# Patient Record
Sex: MALE | Race: WHITE | NOT HISPANIC OR LATINO | Employment: OTHER | URBAN - NONMETROPOLITAN AREA
[De-identification: names, ages, dates, MRNs, and addresses within clinical notes are randomized per-mention and may not be internally consistent; named-entity substitution may affect disease eponyms.]

---

## 2017-05-18 ENCOUNTER — OFFICE VISIT - GICH (OUTPATIENT)
Dept: FAMILY MEDICINE | Facility: OTHER | Age: 32
End: 2017-05-18

## 2017-05-18 ENCOUNTER — HISTORY (OUTPATIENT)
Dept: FAMILY MEDICINE | Facility: OTHER | Age: 32
End: 2017-05-18

## 2017-05-18 DIAGNOSIS — L98.9 DISORDER OF SKIN OR SUBCUTANEOUS TISSUE: ICD-10-CM

## 2017-05-18 DIAGNOSIS — D22.9 MELANOCYTIC NEVUS: ICD-10-CM

## 2017-06-01 ENCOUNTER — HISTORY (OUTPATIENT)
Dept: FAMILY MEDICINE | Facility: OTHER | Age: 32
End: 2017-06-01

## 2017-06-01 ENCOUNTER — OFFICE VISIT - GICH (OUTPATIENT)
Dept: FAMILY MEDICINE | Facility: OTHER | Age: 32
End: 2017-06-01

## 2017-06-01 DIAGNOSIS — L98.9 DISORDER OF SKIN OR SUBCUTANEOUS TISSUE: ICD-10-CM

## 2017-07-31 ENCOUNTER — HISTORY (OUTPATIENT)
Dept: FAMILY MEDICINE | Facility: OTHER | Age: 32
End: 2017-07-31

## 2017-07-31 ENCOUNTER — OFFICE VISIT - GICH (OUTPATIENT)
Dept: FAMILY MEDICINE | Facility: OTHER | Age: 32
End: 2017-07-31

## 2017-07-31 DIAGNOSIS — S05.02XA INJURY OF CONJUNCTIVA AND CORNEAL ABRASION OF LEFT EYE WITHOUT FOREIGN BODY: ICD-10-CM

## 2017-12-28 NOTE — PROGRESS NOTES
Patient Information     Patient Name MRN Sex Jean Paul Cee [id number] Male 1985      Progress Notes by Iman Stanford MD at 2017  9:45 AM     Author:  Iman Stanford MD Service:  (none) Author Type:  Physician     Filed:  2017  9:44 AM Encounter Date:  2017 Status:  Signed     :  Iman Stanford MD (Physician)            SUBJECTIVE:    Jean Paul Andujar is a 31 y.o. male who presents for skin lesion on right middle finger    HPI Comments: Was seen by [facility], she recommend returning for bx if changed  He reports had a small lump which has resolved, now just scaly  He is less worried about it today  No personal or family history of skin cancer  He works outside, fair skinned      No Known Allergies and   No current outpatient prescriptions on file prior to visit.     No current facility-administered medications on file prior to visit.        REVIEW OF SYSTEMS:  ROS    OBJECTIVE:  /80  Pulse 60  Wt 95.3 kg (210 lb 3.2 oz)  BMI 29.52 kg/m2    EXAM:   Physical Exam   Skin:   Right middle finger, medial side, 0.2cm lightly pigmented macule with hypopigmented center, mild scales    Treated with liquid nitrogen in 3 cycles       ASSESSMENT/PLAN:  Skin lesion, some characteristics of dermatofibroma, but now has scaling, reviewed options  w patient    Plan:  Liquid nitrogen applied in 3 cycles, he will monitor for resolution, if enlarges, darkens or changes, he will return for biopsy    Iman Craven MD  9:43 AM 2017

## 2017-12-28 NOTE — PATIENT INSTRUCTIONS
Patient Information     Patient Name MRN Jean Paul Kirkpatrick [id number] Male 1985      Patient Instructions by Cat Pittman NP at 2017  4:30 PM     Author:  Cat Pittman NP Service:  (none) Author Type:  PHYS- Nurse Practitioner     Filed:  2017  5:36 PM Encounter Date:  2017 Status:  Signed     :  Cat Pittman NP (PHYS- Nurse Practitioner)            Corneal Abrasion   ________________________________________________________________________  KEY POINTS    A corneal abrasion is a scratch on the clear outer layer on the front of your eye.    Treatment may include eye drops or ointment. If you wear contact lenses, your healthcare provider may ask you to wait one week or longer after your cornea has healed before you wear your contact lenses again.  ________________________________________________________________________  What is a corneal abrasion?   A corneal abrasion is a scratch on the surface of the cornea, which is the clear outer layer on the front of your eye. Corneal abrasions are usually very painful.  Most corneal abrasions heal in a day or two. Some abrasions will take longer to heal.   What is the cause?   Corneal abrasions can be caused by:    Getting poked or hit in your eye    Getting scratched in your eye with any object, such as a fingernail, comb, or twig    Getting something in your eye, such as a splinter, dirt, or eye makeup    Chipped or cracked contact lenses, or wearing lenses too long  What are the symptoms?   Symptoms may include:    Redness, pain, and watery eyes    A scratchy feeling or feeling like there is something in your eye    Painful sensitivity to light    Blurry vision    Eyelid spasms  How is it diagnosed?   Your healthcare provider will ask about your symptoms and activities and examine your eye. Using eye drops and a light that makes an abrasion easier to see, your provider will look at your eye. The drops contain a dye that  will make your vision and tears yellow for a few minutes.  How is it treated?   If something is still in your eye, your healthcare provider will remove it.  Your healthcare provider may:    Give you antibiotic drops or ointment to prevent an infection.    Give you eye drops or ointment to help relieve pain.    If you also have eyelid spasms, or severe sensitivity to light, your provider may give you eye drops that dilate your pupil, which relaxes the muscles in your eye and reduces pain.    Place a contact lens bandage over your cornea. The bandage helps to speed up healing and reduces eye pain.  If you wear contact lenses, your healthcare provider may ask you to wait one week or longer after your cornea has healed before you wear your contact lenses again.   How can I take care of myself?   Follow the full course of treatment your healthcare provider prescribes. Ask your healthcare provider:    How long it will take to recover    If there are activities you should avoid and when you can return to your normal activities    How to take care of yourself at home    What symptoms or problems you should watch for and what to do if you have them  Make sure you know when you should come back for a checkup. Keep all appointments for provider visits or tests.  How can I help prevent a corneal abrasion?    Once you ve had a corneal abrasion, you are at risk for a repeat abrasion in the same area. It may help to use artificial tears or eye ointment to lubricate your eyes well after an abrasion has healed.    To help prevent severe eye injuries, wear safety eyewear when you:    Do any work around the house that requires hammering, power tools, chemicals, or splatter of any kind    Play sports such as paintball, racquetball, lacrosse, hockey, and fast-pitch softball    Shoot firearms or use explosives of any kind    Are in a high-risk area such as a construction site or shooting range    Follow your eye care provider's  instructions for wearing and caring for contact lenses. Do not wear them longer than recommended.

## 2017-12-28 NOTE — PROGRESS NOTES
Patient Information     Patient Name MRN Sex Jean Paul Cee [id number] Male 1985      Progress Notes by Cat Pittman NP at 2017  4:30 PM     Author:  Cat Pittman NP Service:  (none) Author Type:  PHYS- Nurse Practitioner     Filed:  2017  9:41 PM Encounter Date:  2017 Status:  Signed     :  Cat Pittman NP (PHYS- Nurse Practitioner)            Nursing Notes:   Celeste Gaston  2017  5:49 PM  Addendum  Patient presents to clinic with sawdust in left eye.  Celeste BRIAN GurmeetjenelleLPN ....................  2017   4:57 PM    Sodium Chloride Solution ordered by Cat Pittman NP.  Medication administered per order in [employer]   Lot # [id number]  Exp.   NDC 5729-4457-34  Patient tolerated well.  Celeste Gaston LPN..................2017  5:45 PM    Tetracaine hydrochloride ophthalmic solution 0.5% ordered by Cat Pittman NP.  Medication administered per order in [employer]   Lot # [id number]  Exp. 2018  NDC 0526-3784-40  Patient tolerated well.  Celeste Gaston LPN..................2017  5:46 PM    Fluorescein Ophthalmic Strip 1mg ordered by Cat Pittman NP.  Medication administered per order in [employer]   Lot # [id number]  Exp.   NDC 87392-345-26  Patient tolerated well.  Celeste Gaston LPN..................2017  5:47 PM        SUBJECTIVE:    Jean Paul Andujar is a 31 y.o. male who presents for eye injury     Eye Injury    The left eye is affected. This is a new problem. The current episode started today. The problem occurs constantly. The problem has been unchanged. The injury mechanism was a foreign body (Sawdust flew into his eye). The pain is moderate. There is no known exposure to pink eye. He does not wear contacts. Associated symptoms include blurred vision, eye redness and a foreign body sensation. Pertinent negatives include no eye discharge, double vision, fever, itching, nausea, photophobia, recent URI or vomiting. He has tried nothing  "for the symptoms. The treatment provided no relief.       No current outpatient prescriptions on file prior to visit.     No current facility-administered medications on file prior to visit.        REVIEW OF SYSTEMS:  Review of Systems   Constitutional: Negative for fever.   Eyes: Positive for blurred vision and redness. Negative for double vision, photophobia and discharge.   Gastrointestinal: Negative for nausea and vomiting.   Skin: Negative for itching.       OBJECTIVE:  /78  Pulse 64  Temp 98.2  F (36.8  C) (Tympanic)  Ht 1.803 m (5' 11\")  Wt 94.7 kg (208 lb 12.8 oz)  BMI 29.12 kg/m2    EXAM:   Physical Exam   Constitutional: He is well-developed, well-nourished, and in no distress.   HENT:   Head: Normocephalic and atraumatic.   Eyes: EOM are normal. Pupils are equal, round, and reactive to light. Right eye exhibits no discharge and no exudate. Foreign body present in the left eye. Right conjunctiva is not injected. Right conjunctiva has no hemorrhage. Left conjunctiva is injected. Left conjunctiva has no hemorrhage.   Slit lamp exam:       The right eye shows no fluorescein uptake.        The left eye shows fluorescein uptake.       Neck: Neck supple.   Cardiovascular: Normal rate.    Pulmonary/Chest: Effort normal. No respiratory distress.   Lymphadenopathy:     He has no cervical adenopathy.   Skin: Skin is warm and dry.   Psychiatric: Affect normal.   Nursing note and vitals reviewed.      ASSESSMENT/PLAN:    ICD-10-CM    1. Cornea abrasion, left, initial encounter S05.02XA gentamicin (GENOPTIC) 0.3 % ophthalmic solution      AMB CONSULT TO OPTOMETRY/OPHTHALMOLOGY        Plan:  Eye drop discussed. FB removed. Referral to eye doctor incase he is not better in 24-36 hours. Eye patch for comfort. F/u if needed with eye doctor. .      LEONARDA REYES NP ....................  8/1/2017   9:41 PM            "

## 2017-12-30 NOTE — NURSING NOTE
Patient Information     Patient Name MRN Jean Paul Kirkpatrick [id number] Male 1985      Nursing Note by Mckenna Feliz at 2017  9:45 AM     Author:  Mckenna Feliz Service:  (none) Author Type:  (none)     Filed:  2017 11:46 AM Encounter Date:  2017 Status:  Signed     :  Mckenna Feliz            Patient presents to clinic for derm issue on right middle finger.  Mckenna Feliz LPN............................2017 9:46 AM

## 2017-12-30 NOTE — NURSING NOTE
Patient Information     Patient Name MRN Jean Paul Kirkpatrick [id number] Male 1985      Nursing Note by Celeste Gaston at 2017  4:30 PM     Author:  Celeste Gaston  Service:  (none) Author Type:  (none)     Filed:  2017  5:49 PM  Encounter Date:  2017 Status:  Addendum     :  Celeste Gaston        Related Notes: Original Note by Celeste Gaston filed at 2017  5:34 PM            Patient presents to clinic with sawdust in left eye.  Celeste ChristiansonN ....................  2017   4:57 PM    Sodium Chloride Solution ordered by Cat Pittman NP.  Medication administered per order in [employer]   Lot # [id number]  Exp.   NDC 8142-7394-76  Patient tolerated well.  Celeste Gaston LPN..................2017  5:45 PM    Tetracaine hydrochloride ophthalmic solution 0.5% ordered by Cat Pittman NP.  Medication administered per order in [employer]   Lot # [id number]  Exp. 2018  NDC 7538-4699-93  Patient tolerated well.  Celeste Gaston LPN..................2017  5:46 PM    Fluorescein Ophthalmic Strip 1mg ordered by Cat Pittman NP.  Medication administered per order in [employer]   Lot # [id number]  Exp.   NDC 64667-513-11  Patient tolerated well.  Celeste Gaston LPN..................2017  5:47 PM

## 2018-01-05 NOTE — PROGRESS NOTES
Patient Information     Patient Name MRN Sex Jean Paul Cee [id number] Male 1985      Progress Notes by Hodan Grimes MD at 2017  2:00 PM     Author:  Hodan Grimes MD Service:  (none) Author Type:  Physician     Filed:  2017  4:54 PM Encounter Date:  2017 Status:  Signed     :  Hodan Grimes MD (Physician)            SUBJECTIVE:    Jean Paul Andujar is a 31 y.o. male who presents for a mole.      HPI  I personally reviewed medications/allergies/history listed below:      No Known Allergies,   Family History       Problem   Relation Age of Onset     Genetic  Other      None noted     ,   No current outpatient prescriptions on file prior to visit.     No current facility-administered medications on file prior to visit.    ,   Past Medical History:     Diagnosis  Date     Dyspepsia      h-pylori positive       Health care maintenance    ,   Patient Active Problem List     Diagnosis  Code     DYSPEPSIA, HX OF Z87.19     SEBACEOUS CYST L72.3    and No past surgical history on file.  Social History     Social History        Marital status:       Spouse name: N/A     Number of children:  N/A     Years of education:  N/A     Occupational History      Not on file.     Social History Main Topics       Smoking status: Never Smoker     Smokeless tobacco: Never Used     Alcohol use No     Drug use: No     Sexual activity: Not on file     Other Topics  Concern     Not on file      Social History Narrative     , no children.  .    Wife - Vashti    Mom - Kaye Andujar    Sister - Ya Martinez      REVIEW OF SYSTEMS:  Review of Systems   All other systems reviewed and are negative.      OBJECTIVE:  /78  Pulse 80  Wt 96.4 kg (212 lb 9.6 oz)  BMI 29.86 kg/m2    EXAM:   Physical Exam   Constitutional: He is well-developed, well-nourished, and in no distress.   Skin:   Right lateral 3rd finger with an approximate 3 mm irregularly bordered  salmon-brown colored raised lesion.  There is a small 1 mm central scab.    In general he is very fair skin with numerous freckles.   Psychiatric: Affect normal.   PHQ Depression Screen     Over the last 2 weeks, how often have you been bothered by any of the following problems?  Patient declined.                      ASSESSMENT/PLAN:    ICD-10-CM    1. Skin lesion L98.9    2. Numerous moles D22.9 AMB CONSULT TO DERMATOLOGY        Plan:    1.  He will return to clinic in the near future to have this lesion removed.  It would be amenable to either punch biopsy or shave biopsy.    2.  He is referred to Dermatology for more extensive check of moles given his fair skin.  Hodan Grimes MD

## 2018-01-05 NOTE — NURSING NOTE
Patient Information     Patient Name MRN Jean Paul Kirkpatrick [id number] Male 1985      Nursing Note by Yanet Blum at 2017  2:00 PM     Author:  Yanet Blum Service:  (none) Author Type:  (none)     Filed:  2017  2:29 PM Encounter Date:  2017 Status:  Signed     :  Yanet Blum            Patient is here today to establish care and also have a mole looked at on his right middle finger. Yanet Blum LPN......................2017 2:17 PM

## 2018-01-26 VITALS
WEIGHT: 212.6 LBS | SYSTOLIC BLOOD PRESSURE: 122 MMHG | DIASTOLIC BLOOD PRESSURE: 78 MMHG | HEART RATE: 80 BPM | HEIGHT: 71 IN | BODY MASS INDEX: 29.86 KG/M2 | DIASTOLIC BLOOD PRESSURE: 78 MMHG | HEART RATE: 60 BPM | WEIGHT: 210.2 LBS | SYSTOLIC BLOOD PRESSURE: 130 MMHG | DIASTOLIC BLOOD PRESSURE: 80 MMHG | WEIGHT: 208.8 LBS | SYSTOLIC BLOOD PRESSURE: 128 MMHG | BODY MASS INDEX: 29.52 KG/M2 | TEMPERATURE: 98.2 F | BODY MASS INDEX: 29.23 KG/M2 | HEART RATE: 64 BPM

## 2018-02-06 ENCOUNTER — DOCUMENTATION ONLY (OUTPATIENT)
Dept: FAMILY MEDICINE | Facility: OTHER | Age: 33
End: 2018-02-06

## 2018-02-06 PROBLEM — Z87.19 PERSONAL HISTORY OF OTHER DISEASES OF DIGESTIVE SYSTEM: Status: ACTIVE | Noted: 2018-02-06

## 2018-03-20 ENCOUNTER — OFFICE VISIT (OUTPATIENT)
Dept: FAMILY MEDICINE | Facility: OTHER | Age: 33
End: 2018-03-20
Attending: FAMILY MEDICINE
Payer: COMMERCIAL

## 2018-03-20 VITALS
TEMPERATURE: 97.6 F | WEIGHT: 215 LBS | HEIGHT: 71 IN | SYSTOLIC BLOOD PRESSURE: 136 MMHG | BODY MASS INDEX: 30.1 KG/M2 | HEART RATE: 72 BPM | DIASTOLIC BLOOD PRESSURE: 80 MMHG

## 2018-03-20 DIAGNOSIS — D22.9 ATYPICAL MOLE: ICD-10-CM

## 2018-03-20 DIAGNOSIS — S61.219A CUT OF FINGER: Primary | ICD-10-CM

## 2018-03-20 PROCEDURE — 99213 OFFICE O/P EST LOW 20 MIN: CPT | Performed by: FAMILY MEDICINE

## 2018-03-20 ASSESSMENT — PAIN SCALES - GENERAL: PAINLEVEL: NO PAIN (0)

## 2018-03-20 NOTE — NURSING NOTE
Patient is here for concerns of cut on right pointer finger. Happened last night while washing dishes, a glass broke.   Bisi Lujan LPN .............3/20/27726:06 AM

## 2018-03-20 NOTE — MR AVS SNAPSHOT
"              After Visit Summary   3/20/2018    Jean Paul Andujar    MRN: [id number]           Patient Information     Date Of Birth          1985        Visit Information        Provider Department      3/20/2018 9:00 AM Iman Infante MD [facility]        Today's Diagnoses     Cut of finger    -  1    Atypical mole           Follow-ups after your visit        Additional Services     GENERAL SURG ADULT REFERRAL       mole excision                  Who to contact     If you have questions or need follow up information about today's clinic visit or your schedule please contact [facility] directly at [phone].  Normal or non-critical lab and imaging results will be communicated to you by MyChart, letter or phone within 4 business days after the clinic has received the results. If you do not hear from us within 7 days, please contact the clinic through MyChart or phone. If you have a critical or abnormal lab result, we will notify you by phone as soon as possible.  Submit refill requests through [employer] or call your pharmacy and they will forward the refill request to us. Please allow 3 business days for your refill to be completed.          Additional Information About Your Visit        Care EveryWhere ID     This is your Care EveryWhere ID. This could be used by other organizations to access your [place] medical records  [id number]        Your Vitals Were     Pulse Temperature Height BMI (Body Mass Index)          72 97.6  F (36.4  C) 5' 11\" (1.803 m) 29.99 kg/m2         Blood Pressure from Last 3 Encounters:   03/20/18 136/80   07/31/17 128/78   06/01/17 122/80    Weight from Last 3 Encounters:   03/20/18 215 lb (97.5 kg)   07/31/17 208 lb 12.8 oz (94.7 kg)   06/01/17 210 lb 3.2 oz (95.3 kg)              We Performed the Following     GENERAL SURG ADULT REFERRAL        Primary Care Provider Fax #    Physician No Ref-Primary [phone]       " No address on file        Equal Access to Services     NITHIN RIVERA : Hadii aad ku hadtom Gregoryali, wacheriseda juanischristopherha, qawarren karaimarc sanchez, anam rice. So [facility] [phone].    ATENCIÓN: Si habla español, tiene a sadler disposición servicios gratuitos de asistencia lingüística. Llame al [phone].    We comply with applicable federal civil rights laws and [place] laws. We do not discriminate on the basis of race, color, national origin, age, disability, sex, sexual orientation, or gender identity.            Thank you!     Thank you for choosing [facility] AND [facility]  for your care. Our goal is always to provide you with excellent care. Hearing back from our patients is one way we can continue to improve our services. Please take a few minutes to complete the written survey that you may receive in the mail after your visit with us. Thank you!             Your Updated Medication List - Protect others around you: Learn how to safely use, store and throw away your medicines at www.disposemymeds.org.          This list is accurate as of 3/20/18  9:44 AM.  Always use your most recent med list.                   Brand Name Dispense Instructions for use Diagnosis    RANITIDINE HCL PO      Take by mouth daily

## 2018-03-20 NOTE — PROGRESS NOTES
"  SUBJECTIVE:   Jean Paul Andujar is a 32 year old male who presents to clinic today for the following health issues:  Nursing Notes:   Bisi Lujan LPN  3/20/2018  9:36 AM  Signed  Patient is here for concerns of cut on right pointer finger. Happened last night while washing dishes, a glass broke.   Bisi Lujan LPN .............3/20/85073:06 AM      HPI  32-year-old male presents after cutting his finger last evening while doing the dishes.  Medial side of the right pointer finger.  It oozed throughout the night but seems of slowing down this morning.  Sensation and strength are intact.  Tdap is current.    Patient Active Problem List    Diagnosis Date Noted     Personal history of other diseases of digestive system 02/06/2018     Priority: Medium     Overview:   History of dyspepsia, h-pylori positive       Sebaceous cyst 06/07/2010     Priority: Medium     Past Medical History:   Diagnosis Date     Encounter for general adult medical examination without abnormal findings     No Comments Provided     Epigastric pain     h-pylori positive        Review of Systems     OBJECTIVE:     /80  Pulse 72  Temp 97.6  F (36.4  C)  Ht 5' 11\" (1.803 m)  Wt 215 lb (97.5 kg)  BMI 29.99 kg/m2  Body mass index is 29.99 kg/(m^2).  Physical Exam    Superficial 2 cm laceration medial aspect of the right pointer finger.  No gaping.  Wound is clean sensation and strength are normal and theed and Steri-Strips are applied to reapproximate wound edges.   pointer finger.     ASSESSMENT/PLAN:     1. Cut of finger    2. Atypical mole            Discussed wound care.  Tetanus is current.  Given the location of mole have referred to general surgery for excision.    Iman Stanford MD  [facility] [place]  "

## 2018-04-19 ENCOUNTER — OFFICE VISIT (OUTPATIENT)
Dept: SURGERY | Facility: OTHER | Age: 33
End: 2018-04-19
Attending: SURGERY
Payer: COMMERCIAL

## 2018-04-19 VITALS — BODY MASS INDEX: 30.46 KG/M2 | DIASTOLIC BLOOD PRESSURE: 92 MMHG | SYSTOLIC BLOOD PRESSURE: 136 MMHG | WEIGHT: 218.4 LBS

## 2018-04-19 DIAGNOSIS — Z53.9 DIAGNOSIS NOT YET DEFINED: Primary | ICD-10-CM

## 2018-04-19 PROCEDURE — 11602 EXC TR-EXT MAL+MARG 1.1-2 CM: CPT | Performed by: SURGERY

## 2018-04-19 PROCEDURE — 88305 TISSUE EXAM BY PATHOLOGIST: CPT | Performed by: SURGERY

## 2018-04-19 NOTE — PROGRESS NOTES
Procedure Note      Pre/Post Operative Diagnosis:   Right second finger skin lesion     Procedure:   Removal of right second finger skin lesion     Surgeon: Joaquin Can MD    Local Anesthesia: 1% lidocaine with 0.25% Marcaine     Indication for the procedure:  This is a 32 year old male  patient referred by self with a right second finger skin lesion      After explaining the risks to include bleeding, infection, recurrence or need for reexcision, and scarring the patientwished to proceed.    Procedure:   The area was prepped and draped in usual sterile fashion with ChloraPrep.   After, adequate localanesthesia, an 0.8 cm X 1.1 cm elliptical skin incision was made to encompass the 0.6 cm lesion margins.  The skin was closed with 4-0 Nylon     Plan:  The patient will be called to review the pathology. Suture removal in 10-14 days.  Patient will follow up if there any problems with the wound including redness or drainage.      Joaquin Can....................  4/19/2018   3:57 PM

## 2018-04-19 NOTE — MR AVS SNAPSHOT
After Visit Summary   4/19/2018    Jean Paul Andujar    MRN: [id number]           Patient Information     Date Of Birth          1985        Visit Information        Provider Department      4/19/2018 3:20 PM Joaquin Can MD; [facility] [address] SURGERY [facility]        Today's Diagnoses     DIAGNOSIS NOT YET DEFINED    -  1       Follow-ups after your visit        Who to contact     If you have questions or need follow up information about today's clinic visit or your schedule please contact [facility] directly at [phone].  Normal or non-critical lab and imaging results will be communicated to you by MyChart, letter or phone within 4 business days after the clinic has received the results. If you do not hear from us within 7 days, please contact the clinic through MyChart or phone. If you have a critical or abnormal lab result, we will notify you by phone as soon as possible.  Submit refill requests through [employer] or call your pharmacy and they will forward the refill request to us. Please allow 3 business days for your refill to be completed.          Additional Information About Your Visit        Care EveryWhere ID     This is your Care EveryWhere ID. This could be used by other organizations to access your [place] medical records  [id number]        Your Vitals Were     BMI (Body Mass Index)                   30.46 kg/m2            Blood Pressure from Last 3 Encounters:   04/19/18 (!) 136/92   03/20/18 136/80   07/31/17 128/78    Weight from Last 3 Encounters:   04/19/18 99.1 kg (218 lb 6.4 oz)   03/20/18 97.5 kg (215 lb)   07/31/17 94.7 kg (208 lb 12.8 oz)              We Performed the Following     0.6-1CM TRUNK,ARM,LEG     Surgical pathology exam        Primary Care Provider Fax #    Physician No Ref-Primary [phone]       No address on file        Equal Access to Services     BEATA STAFFORD : ken Bang,  anam gunn talauryn negronnahomi ruth ah. So [facility] [phone].    ATENCIÓN: Si habla xavi, tiene a sadler disposición servicios gratuitos de asistencia lingüística. Llame al [phone].    We comply with applicable federal civil rights laws and [place] laws. We do not discriminate on the basis of race, color, national origin, age, disability, sex, sexual orientation, or gender identity.            Thank you!     Thank you for choosing [facility] AND [facility]  for your care. Our goal is always to provide you with excellent care. Hearing back from our patients is one way we can continue to improve our services. Please take a few minutes to complete the written survey that you may receive in the mail after your visit with us. Thank you!             Your Updated Medication List - Protect others around you: Learn how to safely use, store and throw away your medicines at www.disposemymeds.org.          This list is accurate as of 4/19/18  3:59 PM.  Always use your most recent med list.                   Brand Name Dispense Instructions for use Diagnosis    RANITIDINE HCL PO      Take by mouth daily

## 2018-04-19 NOTE — NURSING NOTE
Patient presents to clinic with a mole on his finger he would like removed.  Meghana Nicholas LPN  4/19/2018  3:19 PM

## 2018-04-19 NOTE — NURSING NOTE
Consent form signed.    TIMEOUT  Universal Protocol    A. Pre-procedure verification complete yes  1-relevant information / documentation available, reviewed and properly matched to the patient; 2-consent accurate and complete, 3-equipment and supplies available    B. Site marking complete na  Site marked if not in continuous attendance with patient    C. TIME OUT completed yes  Time Out was conducted just prior to starting procedure to verify the eight required elements: 1-patient identity, 2-consent accurate and complete, 3-position, 4-correct side/site marked (if applicable), 5-procedure, 6-relevant images / results properly labeled and displayed (if applicable), 7-antibiotics / irrigation fluids (if applicable), 8-safety precautions.

## 2018-04-30 ENCOUNTER — TELEPHONE (OUTPATIENT)
Dept: SURGERY | Facility: OTHER | Age: 33
End: 2018-04-30

## 2018-05-02 NOTE — TELEPHONE ENCOUNTER
Patient has a couple of questions regarding the letter he was sent.  Wondering if he should come back if he see's any more abnormal lesions.  He was instructed to come back if he has any concerns.  Meghana Nicholas LPN  5/2/2018  9:09 AM

## 2018-05-07 ENCOUNTER — OFFICE VISIT (OUTPATIENT)
Dept: FAMILY MEDICINE | Facility: OTHER | Age: 33
End: 2018-05-07
Attending: PHYSICIAN ASSISTANT
Payer: COMMERCIAL

## 2018-05-07 VITALS
SYSTOLIC BLOOD PRESSURE: 120 MMHG | WEIGHT: 216 LBS | TEMPERATURE: 98.5 F | DIASTOLIC BLOOD PRESSURE: 88 MMHG | HEART RATE: 68 BPM | BODY MASS INDEX: 30.13 KG/M2

## 2018-05-07 DIAGNOSIS — S05.02XA ABRASION OF LEFT CORNEA, INITIAL ENCOUNTER: Primary | ICD-10-CM

## 2018-05-07 PROCEDURE — 99213 OFFICE O/P EST LOW 20 MIN: CPT | Performed by: PHYSICIAN ASSISTANT

## 2018-05-07 RX ORDER — POLYMYXIN B SULFATE AND TRIMETHOPRIM 1; 10000 MG/ML; [USP'U]/ML
1 SOLUTION OPHTHALMIC 4 TIMES DAILY
Qty: 1 ML | Refills: 0 | Status: SHIPPED | OUTPATIENT
Start: 2018-05-07 | End: 2018-05-12

## 2018-05-07 NOTE — MR AVS SNAPSHOT
After Visit Summary   5/7/2018    Jean Paul Andujar    MRN: [id number]           Patient Information     Date Of Birth          1985        Visit Information        Provider Department      5/7/2018 7:00 PM Carla Cevallos PA [facility]        Today's Diagnoses     Abrasion of left cornea, initial encounter    -  1      Care Instructions    Corneal abrasion left eye  Start polytrim 1-2 drops to affected eye 4 times daily for 3-5 days  Follow up with opthalmology for eye pain or worsening of symptoms, or no improvement after 24-48 hours.   Seek immediate care     Increasing eye pain or pain that does not improve after 24 hours    Discharge from the eye    Increasing redness of the eye or swelling of the eyelids    Worsening vision    Symptoms get worse after the abrasion has healed    Corneal Abrasion    You have received a scratch or scrape (abrasion) to your cornea. The cornea is the clear part in the front of the eye. This sensitive area is very painful when injured. You may make tears frequently, and your vision may be blurry until the injury heals. You may be sensitive to light.  This part of the body heals quickly. You can expect the pain to go away within 24 to 48 hours. If the abrasion is large or deep, your doctor may apply an eye patch, although this is not always done. An antibiotic ointment or eye drops may also be used to prevent infection.  Numbing drops may be used to relieve the pain temporarily so that your eyes can be examined. But these drops can t be prescribed for home use because that would prevent healing and lead to more serious problems. Also, if you can t feel your eye, there is a chance of accidentally injuring it further without knowing it.  Home care    A cold pack may be applied over the eye (or eye patch) for 20 minutes at a time, to reduce pain. To make a cold pack, put ice cubes in a plastic bag that seals at the top. Wrap the bag in a clean,  thin towel or cloth.    You may use acetaminophen or ibuprofen to control pain, unless another pain medicine was prescribed. Note: If you have chronic liver or kidney disease or have ever had a stomach ulcer or GI bleeding, talk with your doctor before using these medicines.    Rest your eyes and don t read until symptoms are gone.    If you use contact lenses, don t wear them until all symptoms are gone.    If your vision is affected by the corneal abrasion or if an eye patch was applied, don t drive a motor vehicle or operate machinery until all symptoms are gone. You may have trouble judging distances using only one eye.    If your eyes are sensitive to light, try wearing sunglasses, or stay indoors until symptoms go away.  Follow-up care  Follow up with your healthcare provider, or as advised.    If no patch was put on your eye and the pain continues for more than 48 hours, you should have another exam. Contact your healthcare provider to arrange this.    If your eye was patched and you were asked to remove the patch yourself, see your healthcare provider. Contact your healthcare provider if you still have pain after the patch is removed.    If you were given a return appointment for patch removal and re-examination, be sure to keep the appointment. Leaving the patch in place longer than advised could be harmful.  When to seek medical advice  Call your healthcare provider right away if any of these occur.    Increasing eye pain or pain that does not improve after 24 hours    Discharge from the eye    Increasing redness of the eye or swelling of the eyelids    Worsening vision    Symptoms get worse after the abrasion has healed  Date Last Reviewed: 7/1/2017 2000-2017 [employer]. [address] [place] [address], [place] [address]. All rights reserved. This information is not intended as a substitute for professional medical care. Always follow your healthcare professional's instructions.                 Follow-ups after your visit        Follow-up notes from your care team     Return if symptoms worsen or fail to improve.      Who to contact     If you have questions or need follow up information about today's clinic visit or your schedule please contact [facility] directly at [phone].  Normal or non-critical lab and imaging results will be communicated to you by MyChart, letter or phone within 4 business days after the clinic has received the results. If you do not hear from us within 7 days, please contact the clinic through MyChart or phone. If you have a critical or abnormal lab result, we will notify you by phone as soon as possible.  Submit refill requests through [employer] or call your pharmacy and they will forward the refill request to us. Please allow 3 business days for your refill to be completed.          Additional Information About Your Visit        Care EveryWhere ID     This is your Care EveryWhere ID. This could be used by other organizations to access your [place] medical records  [id number]        Your Vitals Were     Pulse Temperature BMI (Body Mass Index)             68 98.5  F (36.9  C) (Tympanic) 30.13 kg/m2          Blood Pressure from Last 3 Encounters:   05/07/18 120/88   04/19/18 (!) 136/92   03/20/18 136/80    Weight from Last 3 Encounters:   05/07/18 216 lb (98 kg)   04/19/18 218 lb 6.4 oz (99.1 kg)   03/20/18 215 lb (97.5 kg)              Today, you had the following     No orders found for display         Today's Medication Changes          These changes are accurate as of 5/7/18  8:31 PM.  If you have any questions, ask your nurse or doctor.               Start taking these medicines.        Dose/Directions    trimethoprim-polymyxin b ophthalmic solution   Commonly known as:  POLYTRIM   Used for:  Abrasion of left cornea, initial encounter   Started by:  Carla Cevallos PA        Dose:  1 drop   Place 1 drop Into the left eye 4 times daily for 5 days    Quantity:  1 mL   Refills:  0            Where to get your medicines      These medications were sent to [id number] - [place], [place] - [address]  [address], [facility] [address]     Phone:  [phone]     trimethoprim-polymyxin b ophthalmic solution                Primary Care Provider Fax #    Physician No Ref-Primary [phone]       No address on file        Equal Access to Services     BEATA STAFFORD : Hadii aad ku hadasho Soomaali, waaxda luqadaha, qaybta kaalmada adeegyada, waxay idiin haysydneen clarice deltacaty ruth . So [facility] [phone].    ATENCIÓN: Si habla español, tiene a sadler disposición servicios gratuitos de asistencia lingüística. Llame al [phone].    We comply with applicable federal civil rights laws and [place] laws. We do not discriminate on the basis of race, color, national origin, age, disability, sex, sexual orientation, or gender identity.            Thank you!     Thank you for choosing [facility]  for your care. Our goal is always to provide you with excellent care. Hearing back from our patients is one way we can continue to improve our services. Please take a few minutes to complete the written survey that you may receive in the mail after your visit with us. Thank you!             Your Updated Medication List - Protect others around you: Learn how to safely use, store and throw away your medicines at www.disposemymeds.org.          This list is accurate as of 5/7/18  8:31 PM.  Always use your most recent med list.                   Brand Name Dispense Instructions for use Diagnosis    RANITIDINE HCL PO      Take by mouth daily        trimethoprim-polymyxin b ophthalmic solution    POLYTRIM    1 mL    Place 1 drop Into the left eye 4 times daily for 5 days    Abrasion of left cornea, initial encounter

## 2018-05-08 NOTE — PATIENT INSTRUCTIONS
Corneal abrasion left eye  Start polytrim 1-2 drops to affected eye 4 times daily for 3-5 days  Follow up with opthalmology for eye pain or worsening of symptoms, or no improvement after 24-48 hours.   Seek immediate care     Increasing eye pain or pain that does not improve after 24 hours    Discharge from the eye    Increasing redness of the eye or swelling of the eyelids    Worsening vision    Symptoms get worse after the abrasion has healed    Corneal Abrasion    You have received a scratch or scrape (abrasion) to your cornea. The cornea is the clear part in the front of the eye. This sensitive area is very painful when injured. You may make tears frequently, and your vision may be blurry until the injury heals. You may be sensitive to light.  This part of the body heals quickly. You can expect the pain to go away within 24 to 48 hours. If the abrasion is large or deep, your doctor may apply an eye patch, although this is not always done. An antibiotic ointment or eye drops may also be used to prevent infection.  Numbing drops may be used to relieve the pain temporarily so that your eyes can be examined. But these drops can t be prescribed for home use because that would prevent healing and lead to more serious problems. Also, if you can t feel your eye, there is a chance of accidentally injuring it further without knowing it.  Home care    A cold pack may be applied over the eye (or eye patch) for 20 minutes at a time, to reduce pain. To make a cold pack, put ice cubes in a plastic bag that seals at the top. Wrap the bag in a clean, thin towel or cloth.    You may use acetaminophen or ibuprofen to control pain, unless another pain medicine was prescribed. Note: If you have chronic liver or kidney disease or have ever had a stomach ulcer or GI bleeding, talk with your doctor before using these medicines.    Rest your eyes and don t read until symptoms are gone.    If you use contact lenses, don t wear them until  all symptoms are gone.    If your vision is affected by the corneal abrasion or if an eye patch was applied, don t drive a motor vehicle or operate machinery until all symptoms are gone. You may have trouble judging distances using only one eye.    If your eyes are sensitive to light, try wearing sunglasses, or stay indoors until symptoms go away.  Follow-up care  Follow up with your healthcare provider, or as advised.    If no patch was put on your eye and the pain continues for more than 48 hours, you should have another exam. Contact your healthcare provider to arrange this.    If your eye was patched and you were asked to remove the patch yourself, see your healthcare provider. Contact your healthcare provider if you still have pain after the patch is removed.    If you were given a return appointment for patch removal and re-examination, be sure to keep the appointment. Leaving the patch in place longer than advised could be harmful.  When to seek medical advice  Call your healthcare provider right away if any of these occur.    Increasing eye pain or pain that does not improve after 24 hours    Discharge from the eye    Increasing redness of the eye or swelling of the eyelids    Worsening vision    Symptoms get worse after the abrasion has healed  Date Last Reviewed: 7/1/2017 2000-2017 [employer]. [address] [place] [address], [place] [address]. All rights reserved. This information is not intended as a substitute for professional medical care. Always follow your healthcare professional's instructions.

## 2018-05-08 NOTE — NURSING NOTE
Patient presents to clinic for complaint of left eye irritation. Patient thinks he got a piece of sawdust in the eye. He tried washing it in the shower to no effect.  Todd Epperson LPN...... 7:59 PM 5/7/2018

## 2018-05-08 NOTE — PROGRESS NOTES
SUBJECTIVE: Jean Paul Andujar 32 year old male who complains of left eye pain and possible sawdust in eye after doing some wood working in his home earlier today. He did irrigate with water. Symptoms: pain, tearing. Course is unchanged. Moderated discomfort.     He does wear glasses for distance. He does not have then with him in [place]. No contact lens.     He denies other  significant symptoms on ROS of the cardiovascular and Respiratory systems. Current medications reviewed and the patient denies any problems with compliance or side effects. Medications reviewed.    Past Medical History:   Diagnosis Date     Encounter for general adult medical examination without abnormal findings     No Comments Provided     Epigastric pain     h-pylori positive     Current Outpatient Prescriptions   Medication     trimethoprim-polymyxin b (POLYTRIM) ophthalmic solution     RANITIDINE HCL PO     No current facility-administered medications for this visit.       No Known Allergies      Objective  Vitals:    05/07/18 1959   BP: 120/88   BP Location: Left arm   Patient Position: Sitting   Cuff Size: Adult Large   Pulse: 68   Temp: 98.5  F (36.9  C)   TempSrc: Tympanic   Weight: 216 lb (98 kg)       General: appears well, alert, moderated distress  Eye: left eye is tearing, mild injection, no drainage. Procedure for foreign body removal - see separate note.   Unable to obtain visual acuity as patient does not have corrective glasses with    Foreign body removal  Date/Time: 5/7/2018 5:30 PM  Performed by: RONNIE NICOLAS  Authorized by: RONNIE NICOLAS   Body area: eye  Anesthesia: local infiltration    Anesthesia:  Local Anesthetic: tetracaine drops    Sedation:  Patient sedated: no  Patient restrained: no  Patient cooperative: yes  Localization method: eyelid eversion and magnification  Removal mechanism: moist cotton swab  Eye examined with fluorescein.  Corneal abrasion size: medium  Corneal abrasion location: medial  No residual rust ring  present.  Complexity: simple  Objects recovered: 0  Post-procedure assessment: no foreign body found.  Patient tolerance: Patient tolerated the procedure well with no immediate complications  Comments: Eye was irrigated with sterile saline.  Uptake of fluorescein noted on cornea at 11-12. No foreign body.          ASSESSMENT:      ICD-10-CM    1. Abrasion of left cornea, initial encounter S05.02XA trimethoprim-polymyxin b (POLYTRIM) ophthalmic solution     Foreign body removal       RX per EPIC   Discussed symptomatic treatments per AVS  Return to clinic or see ophthalmologist if symptoms persist or worsen  Patient received verbal and written instruction including review of warning signs    COLBY Wallace on 5/10/2018 at 3:37 PM

## 2018-12-12 ENCOUNTER — ALLIED HEALTH/NURSE VISIT (OUTPATIENT)
Dept: FAMILY MEDICINE | Facility: OTHER | Age: 33
End: 2018-12-12
Attending: FAMILY MEDICINE
Payer: COMMERCIAL

## 2018-12-12 DIAGNOSIS — Z23 NEED FOR PROPHYLACTIC VACCINATION AND INOCULATION AGAINST INFLUENZA: Primary | ICD-10-CM

## 2018-12-12 PROCEDURE — 90686 IIV4 VACC NO PRSV 0.5 ML IM: CPT

## 2018-12-12 PROCEDURE — 90471 IMMUNIZATION ADMIN: CPT

## 2018-12-12 NOTE — PROGRESS NOTES
Injectable Influenza Immunization Documentation    1.  Is the person to be vaccinated sick today?   No    2. Does the person to be vaccinated have an allergy to a component   of the vaccine?   No  Egg Allergy Algorithm Link    3. Has the person to be vaccinated ever had a serious reaction   to influenza vaccine in the past?   No    4. Has the person to be vaccinated ever had Guillain-Barré syndrome?   No    Form completed by Angelica Omer............................... 12/12/2018 1:59 PM  Prior to injection, verified patient identity using patient's name and date of birth.  Due to injection administration, patient instructed to remain in clinic for 15 minutes  afterwards, and to report any adverse reaction to me immediately.    vaccine     Drug Amount Wasted:  None.  Vial/Syringe: Syringe

## 2019-10-18 ENCOUNTER — ALLIED HEALTH/NURSE VISIT (OUTPATIENT)
Dept: FAMILY MEDICINE | Facility: OTHER | Age: 34
End: 2019-10-18
Payer: COMMERCIAL

## 2019-10-18 DIAGNOSIS — Z23 NEED FOR IMMUNIZATION AGAINST INFLUENZA: Primary | ICD-10-CM

## 2019-10-18 PROCEDURE — 90471 IMMUNIZATION ADMIN: CPT

## 2019-10-18 PROCEDURE — 90686 IIV4 VACC NO PRSV 0.5 ML IM: CPT

## 2019-10-18 NOTE — NURSING NOTE
Immunization Documentation    Prior to Immunization administration, verified patients identity using patient's name and date of birth. Please see IMMUNIZATIONS  and order for additional information.  Patient / Parent instructed to remain in clinic for 15 minutes and report any adverse reaction to staff immediately.    Was entire vial of medication used? Yes  Vial/Syringe: Sparkle Harris LPN  10/18/2019   3:02 PM

## 2020-10-27 ENCOUNTER — ALLIED HEALTH/NURSE VISIT (OUTPATIENT)
Dept: FAMILY MEDICINE | Facility: OTHER | Age: 35
End: 2020-10-27
Payer: COMMERCIAL

## 2020-10-27 DIAGNOSIS — Z23 NEED FOR IMMUNIZATION AGAINST INFLUENZA: Primary | ICD-10-CM

## 2020-10-27 PROCEDURE — 90686 IIV4 VACC NO PRSV 0.5 ML IM: CPT

## 2020-10-27 PROCEDURE — 90471 IMMUNIZATION ADMIN: CPT

## 2021-11-09 ENCOUNTER — HOSPITAL ENCOUNTER (EMERGENCY)
Facility: OTHER | Age: 36
Discharge: HOME | End: 2021-11-10
Attending: EMERGENCY MEDICINE
Payer: COMMERCIAL

## 2021-11-09 ENCOUNTER — APPOINTMENT (OUTPATIENT)
Dept: CT IMAGING | Facility: OTHER | Age: 36
End: 2021-11-09
Attending: EMERGENCY MEDICINE
Payer: COMMERCIAL

## 2021-11-09 DIAGNOSIS — R55 SYNCOPE, UNSPECIFIED SYNCOPE TYPE: ICD-10-CM

## 2021-11-09 LAB
ALBUMIN SERPL-MCNC: 4.5 G/DL (ref 3.5–5.7)
ALBUMIN UR-MCNC: NEGATIVE MG/DL
ALP SERPL-CCNC: 50 U/L (ref 34–104)
ALT SERPL W P-5'-P-CCNC: 23 U/L (ref 7–52)
ANION GAP SERPL CALCULATED.3IONS-SCNC: 8 MMOL/L (ref 3–14)
APPEARANCE UR: CLEAR
AST SERPL W P-5'-P-CCNC: 26 U/L (ref 13–39)
BASOPHILS # BLD AUTO: 0.1 10E3/UL (ref 0–0.2)
BASOPHILS NFR BLD AUTO: 1 %
BILIRUB SERPL-MCNC: 0.7 MG/DL (ref 0.3–1)
BILIRUB UR QL STRIP: NEGATIVE
BUN SERPL-MCNC: 16 MG/DL (ref 7–25)
CALCIUM SERPL-MCNC: 9.6 MG/DL (ref 8.6–10.3)
CHLORIDE BLD-SCNC: 100 MMOL/L (ref 98–107)
CO2 SERPL-SCNC: 26 MMOL/L (ref 21–31)
COLOR UR AUTO: YELLOW
CREAT SERPL-MCNC: 0.97 MG/DL (ref 0.7–1.3)
EOSINOPHIL # BLD AUTO: 0.1 10E3/UL (ref 0–0.7)
EOSINOPHIL NFR BLD AUTO: 1 %
ERYTHROCYTE [DISTWIDTH] IN BLOOD BY AUTOMATED COUNT: 12.8 % (ref 10–15)
GFR SERPL CREATININE-BSD FRML MDRD: >90 ML/MIN/1.73M2
GLUCOSE BLD-MCNC: 119 MG/DL (ref 70–105)
GLUCOSE UR STRIP-MCNC: NEGATIVE MG/DL
HCT VFR BLD AUTO: 40.7 % (ref 40–53)
HGB BLD-MCNC: 14.2 G/DL (ref 13.3–17.7)
HGB UR QL STRIP: NEGATIVE
HYALINE CASTS: 1 /LPF
IMM GRANULOCYTES # BLD: 0.1 10E3/UL
IMM GRANULOCYTES NFR BLD: 1 %
KETONES UR STRIP-MCNC: NEGATIVE MG/DL
LEUKOCYTE ESTERASE UR QL STRIP: NEGATIVE
LYMPHOCYTES # BLD AUTO: 1.4 10E3/UL (ref 0.8–5.3)
LYMPHOCYTES NFR BLD AUTO: 10 %
MCH RBC QN AUTO: 29.6 PG (ref 26.5–33)
MCHC RBC AUTO-ENTMCNC: 34.9 G/DL (ref 31.5–36.5)
MCV RBC AUTO: 85 FL (ref 78–100)
MONOCYTES # BLD AUTO: 1 10E3/UL (ref 0–1.3)
MONOCYTES NFR BLD AUTO: 8 %
MUCOUS THREADS #/AREA URNS LPF: PRESENT /LPF
NEUTROPHILS # BLD AUTO: 10.7 10E3/UL (ref 1.6–8.3)
NEUTROPHILS NFR BLD AUTO: 79 %
NITRATE UR QL: NEGATIVE
NRBC # BLD AUTO: 0 10E3/UL
NRBC BLD AUTO-RTO: 0 /100
PH UR STRIP: 5.5 [PH] (ref 5–9)
PLATELET # BLD AUTO: 268 10E3/UL (ref 150–450)
POTASSIUM BLD-SCNC: 3.5 MMOL/L (ref 3.5–5.1)
PROT SERPL-MCNC: 6.9 G/DL (ref 6.4–8.9)
RBC # BLD AUTO: 4.8 10E6/UL (ref 4.4–5.9)
RBC URINE: 1 /HPF
SODIUM SERPL-SCNC: 134 MMOL/L (ref 134–144)
SP GR UR STRIP: 1.01 (ref 1–1.03)
UROBILINOGEN UR STRIP-MCNC: NORMAL MG/DL
WBC # BLD AUTO: 13.3 10E3/UL (ref 4–11)
WBC URINE: 1 /HPF

## 2021-11-09 PROCEDURE — 93005 ELECTROCARDIOGRAM TRACING: CPT | Mod: XU | Performed by: EMERGENCY MEDICINE

## 2021-11-09 PROCEDURE — 85025 COMPLETE CBC W/AUTO DIFF WBC: CPT | Performed by: EMERGENCY MEDICINE

## 2021-11-09 PROCEDURE — 99284 EMERGENCY DEPT VISIT MOD MDM: CPT | Performed by: EMERGENCY MEDICINE

## 2021-11-09 PROCEDURE — 81001 URINALYSIS AUTO W/SCOPE: CPT | Performed by: EMERGENCY MEDICINE

## 2021-11-09 PROCEDURE — 70450 CT HEAD/BRAIN W/O DYE: CPT | Mod: TC

## 2021-11-09 PROCEDURE — 99285 EMERGENCY DEPT VISIT HI MDM: CPT | Mod: 25 | Performed by: EMERGENCY MEDICINE

## 2021-11-09 PROCEDURE — 80053 COMPREHEN METABOLIC PANEL: CPT | Performed by: EMERGENCY MEDICINE

## 2021-11-09 PROCEDURE — 36415 COLL VENOUS BLD VENIPUNCTURE: CPT | Performed by: EMERGENCY MEDICINE

## 2021-11-09 PROCEDURE — 93010 ELECTROCARDIOGRAM REPORT: CPT | Performed by: INTERNAL MEDICINE

## 2021-11-09 ASSESSMENT — ENCOUNTER SYMPTOMS
CHEST TIGHTNESS: 0
NECK PAIN: 0
FEVER: 0
NAUSEA: 0
WOUND: 0
SHORTNESS OF BREATH: 0
SEIZURES: 0
FACIAL ASYMMETRY: 0
CONFUSION: 0
NUMBNESS: 0
VOMITING: 0
TREMORS: 0
CHILLS: 0
LIGHT-HEADEDNESS: 1
HEADACHES: 0
DYSURIA: 0
WEAKNESS: 0
DIZZINESS: 0
BACK PAIN: 0
SPEECH DIFFICULTY: 0

## 2021-11-09 ASSESSMENT — MIFFLIN-ST. JEOR: SCORE: 1921.02

## 2021-11-10 ENCOUNTER — HOSPITAL ENCOUNTER (OUTPATIENT)
Dept: CARDIOLOGY | Facility: OTHER | Age: 36
End: 2021-11-10
Attending: EMERGENCY MEDICINE
Payer: COMMERCIAL

## 2021-11-10 VITALS
DIASTOLIC BLOOD PRESSURE: 90 MMHG | WEIGHT: 216 LBS | SYSTOLIC BLOOD PRESSURE: 138 MMHG | HEART RATE: 67 BPM | OXYGEN SATURATION: 100 % | HEIGHT: 70 IN | RESPIRATION RATE: 12 BRPM | BODY MASS INDEX: 30.92 KG/M2

## 2021-11-10 DIAGNOSIS — R55 SYNCOPE, UNSPECIFIED SYNCOPE TYPE: ICD-10-CM

## 2021-11-10 LAB
HOLD SPECIMEN: NORMAL
HOLD SPECIMEN: NORMAL

## 2021-11-10 PROCEDURE — 93248 EXT ECG>7D<15D REV&INTERPJ: CPT | Performed by: INTERNAL MEDICINE

## 2021-11-10 PROCEDURE — 999N000157 HC STATISTIC RCP TIME EA 10 MIN

## 2021-11-10 PROCEDURE — 93246 EXT ECG>7D<15D RECORDING: CPT

## 2021-11-10 NOTE — PATIENT INSTRUCTIONS
Date Placed on Pt:  11/10/2021    Patient instructed not to:   -take baths, swim, sauna   -remove device prior to 14 days   -use electric blankets   -shower or sweat excessively within first 24 hours of device application  Patient instructed to:   -shower as needed   -be carefull when toweling off and dressing   -press button when cardiac symptoms occur   -document symptoms in log book   -remove and return device (send via mail to [employer])   -Call [employer] with any questions

## 2021-11-10 NOTE — ED PROVIDER NOTES
History     Chief Complaint   Patient presents with     Syncope     HPI  Jean Paul Andujar is a 35 year old male who comes in by private car after having had syncopal episodes at home.  Initially called 911, EMS came and evaluated him he was feeling better so they chose to come in on her own rather than via ambulance.  He remembers being at home and starting to feel flushed and dizzy when he got up to reset the router.  He then sat down for short while until he started to feel better and got up again.  He remembers that it started to happen again.  He then says he has no memory of events for short while until he woke up on the kitchen floor and his wife was there.  She remembers hearing him fall found him face down on the floor in the kitchen.  She started her stopwatch, and it sounds like in the next 3 minutes he was initially completely unresponsive but then woke up and tried to sit up and fell back again a couple of times before he just laid still and put his legs up and waited for things to calm down.  He did land on his right arm and he had a small cut to his lower lip.  He has no other facial pain or headaches.  He feels pretty much back to his baseline at this time.  He says he felt normal throughout the day, was not feeling ill in any way.  He was eating and drinking normally.  Denies any palpitations.    Allergies:  No Known Allergies    Problem List:    Patient Active Problem List    Diagnosis Date Noted     Personal history of other diseases of digestive system 02/06/2018     Priority: Medium     Overview:   History of dyspepsia, h-pylori positive       Sebaceous cyst 06/07/2010     Priority: Medium        Past Medical History:    Past Medical History:   Diagnosis Date     Encounter for general adult medical examination without abnormal findings      Epigastric pain        Past Surgical History:    No past surgical history on file.    Family History:    Family History   Problem Relation Age of Onset      "Genetic Disorder Other         Genetic,None noted       Social History:  Marital Status:   [2]  Social History     Tobacco Use     Smoking status: Never Smoker     Smokeless tobacco: Never Used   Substance Use Topics     Alcohol use: Yes     Drug use: No        Medications:    RANITIDINE HCL PO          Review of Systems   Constitutional: Negative for chills and fever.   HENT: Negative for congestion.    Eyes: Negative for visual disturbance.   Respiratory: Negative for chest tightness and shortness of breath.    Cardiovascular: Negative for chest pain.   Gastrointestinal: Negative for nausea and vomiting.   Genitourinary: Negative for dysuria.   Musculoskeletal: Negative for back pain and neck pain.   Skin: Negative for wound.   Neurological: Positive for syncope and light-headedness. Negative for dizziness, tremors, seizures, facial asymmetry, speech difficulty, weakness, numbness and headaches.   Psychiatric/Behavioral: Negative for confusion.       Physical Exam   BP: (!) 140/85  Pulse: 58  Resp: 18  Height: 177.8 cm (5' 10\")  Weight: 98 kg (216 lb)  SpO2: 99 %  Lying Orthostatic BP: 123/73  Lying Orthostatic Pulse: 76 bpm  Sitting Orthostatic BP: 125/78  Sitting Orthostatic Pulse: 77 bpm  Standing Orthostatic BP: 131/83  Standing Orthostatic Pulse: 89 bpm      Physical Exam  Vitals and nursing note reviewed.   Constitutional:       Appearance: Normal appearance.   HENT:      Head: Normocephalic and atraumatic.      Mouth/Throat:      Mouth: Mucous membranes are moist.   Eyes:      Extraocular Movements: Extraocular movements intact.      Conjunctiva/sclera: Conjunctivae normal.      Pupils: Pupils are equal, round, and reactive to light.   Cardiovascular:      Rate and Rhythm: Normal rate and regular rhythm.      Heart sounds: Normal heart sounds.   Pulmonary:      Effort: Pulmonary effort is normal.      Breath sounds: Normal breath sounds.   Abdominal:      General: Abdomen is flat. Bowel sounds are " normal.   Skin:     General: Skin is warm and dry.   Neurological:      Mental Status: He is alert and oriented to person, place, and time.      Comments: Cranial nerves II through XII intact.   Psychiatric:         Mood and Affect: Mood normal.         Behavior: Behavior normal.         ED Course        Procedures         EKG with normal sinus rhythm, rate in the 70s with no acute ST segment or T wave changes.  No ectopy.         Results for orders placed or performed during the hospital encounter of 11/09/21 (from the past 24 hour(s))   EKG 12 lead   Result Value Ref Range    Systolic Blood Pressure  mmHg    Diastolic Blood Pressure  mmHg    Ventricular Rate 71 BPM    Atrial Rate 71 BPM    SD Interval 148 ms    QRS Duration 98 ms     ms    QTc 452 ms    P Axis 42 degrees    R AXIS 52 degrees    T Axis 58 degrees    Interpretation ECG       Sinus rhythm  Incomplete right bundle branch block  Nonspecific ST abnormality  Abnormal ECG  No previous ECGs available     UA with Microscopic reflex to Culture    Specimen: Urine, Clean Catch   Result Value Ref Range    Color Urine Yellow Colorless, Straw, Light Yellow, Yellow    Appearance Urine Clear Clear    Glucose Urine Negative Negative mg/dL    Bilirubin Urine Negative Negative    Ketones Urine Negative Negative mg/dL    Specific Gravity Urine 1.010 1.005 - 1.030    Blood Urine Negative Negative    pH Urine 5.5 5.0 - 9.0    Protein Albumin Urine Negative Negative mg/dL    Urobilinogen Urine Normal Normal, 2.0 mg/dL    Nitrite Urine Negative Negative    Leukocyte Esterase Urine Negative Negative    Mucus Urine Present (A) None Seen /LPF    RBC Urine 1 <=2 /HPF    WBC Urine 1 <=5 /HPF    Hyaline Casts Urine 1 <=2 /LPF    Narrative    Urine Culture not indicated   CT Head w/o Contrast    Narrative    PROCEDURE INFORMATION:   Exam: CT Head Without Contrast   Exam date and time: 11/9/2021 10:56 PM   Age: 35 years old   Clinical indication: Injury or trauma; Fall;  Blunt trauma (contusions or   hematomas); With loss of consciousness; Loss of consciousness for 30 minutes or   less; Syncope and collapse; Injury details: Patient presents to er with history   of loc x 3, pupils were dilated, PT wasn't tracking, first time he remembers   feeling dizzy when he got up to reset the router, wife states he was out for 3   mins, diaphretic, thready pulse, this then happened again where he fell   backwards, EMS was called and vss normal, PT C/O rib pain and face pain   reported by patient. Significant symptoms had onset approx 2100, PT states he   had vaso vagal once in elementary and once in highschool     TECHNIQUE:   Imaging protocol: Computed tomography of the head without contrast.   Radiation optimization: All CT scans at this facility use at least one of these   dose optimization techniques: automated exposure control; mA and/or kV   adjustment per patient size (includes targeted exams where dose is matched to   clinical indication); or iterative reconstruction.     COMPARISON:   No relevant prior studies available.     FINDINGS:   Brain: Normal. No hemorrhage. Unremarkable white matter. No mass effect.   Cerebral ventricles: No ventriculomegaly.   Paranasal sinuses: Visualized sinuses are unremarkable. No fluid levels.   Mastoid air cells: Visualized mastoid air cells are well aerated.   Bones/joints: Unremarkable. No acute fracture.   Soft tissues: Unremarkable.       Impression    IMPRESSION:   No acute intracranial abnormality.     THIS DOCUMENT HAS BEEN ELECTRONICALLY SIGNED BY SCOTT ABREU DO   Comprehensive metabolic panel   Result Value Ref Range    Sodium 134 134 - 144 mmol/L    Potassium 3.5 3.5 - 5.1 mmol/L    Chloride 100 98 - 107 mmol/L    Carbon Dioxide (CO2) 26 21 - 31 mmol/L    Anion Gap 8 3 - 14 mmol/L    Urea Nitrogen 16 7 - 25 mg/dL    Creatinine 0.97 0.70 - 1.30 mg/dL    Calcium 9.6 8.6 - 10.3 mg/dL    Glucose 119 (H) 70 - 105 mg/dL    Alkaline Phosphatase 50  34 - 104 U/L    AST 26 13 - 39 U/L    ALT 23 7 - 52 U/L    Protein Total 6.9 6.4 - 8.9 g/dL    Albumin 4.5 3.5 - 5.7 g/dL    Bilirubin Total 0.7 0.3 - 1.0 mg/dL    GFR Estimate >90 >60 mL/min/1.73m2   CBC with Platelets & Differential    Narrative    The following orders were created for panel order CBC with Platelets & Differential.  Procedure                               Abnormality         Status                     ---------                               -----------         ------                     CBC with platelets and d...[[id number]]  Abnormal            Final result                 Please view results for these tests on the individual orders.   Extra Tube    Narrative    The following orders were created for panel order Extra Tube.  Procedure                               Abnormality         Status                     ---------                               -----------         ------                     Extra Blue Top Tube[[id number]]                              In process                 Extra Serum Separator Tu...[[id number]]                      In process                   Please view results for these tests on the individual orders.   CBC with platelets and differential   Result Value Ref Range    WBC Count 13.3 (H) 4.0 - 11.0 10e3/uL    RBC Count 4.80 4.40 - 5.90 10e6/uL    Hemoglobin 14.2 13.3 - 17.7 g/dL    Hematocrit 40.7 40.0 - 53.0 %    MCV 85 78 - 100 fL    MCH 29.6 26.5 - 33.0 pg    MCHC 34.9 31.5 - 36.5 g/dL    RDW 12.8 10.0 - 15.0 %    Platelet Count 268 150 - 450 10e3/uL    % Neutrophils 79 %    % Lymphocytes 10 %    % Monocytes 8 %    % Eosinophils 1 %    % Basophils 1 %    % Immature Granulocytes 1 %    NRBCs per 100 WBC 0 <1 /100    Absolute Neutrophils 10.7 (H) 1.6 - 8.3 10e3/uL    Absolute Lymphocytes 1.4 0.8 - 5.3 10e3/uL    Absolute Monocytes 1.0 0.0 - 1.3 10e3/uL    Absolute Eosinophils 0.1 0.0 - 0.7 10e3/uL    Absolute Basophils 0.1 0.0 - 0.2 10e3/uL    Absolute Immature  Granulocytes 0.1 (H) <=0.0 10e3/uL    Absolute NRBCs 0.0 10e3/uL       Medications - No data to display    Assessments & Plan (with Medical Decision Making)     I have reviewed the nursing notes.    I have reviewed the findings, diagnosis, plan and need for follow up with the patient.  Patient is symptom-free here in the emergency department.  Blood work head CT EKG all normal.  Orthostatic vital signs did show a rise of about 13 bpm and his heart rate but no change in blood pressure and he was asymptomatic.  We will send him home with a Zio patch.  Return if worse otherwise follow-up to discuss results in clinic.    New Prescriptions    No medications on file       Final diagnoses:   Syncope, unspecified syncope type       11/9/2021   [facility]     Jan Miller MD  11/10/21 0029

## 2021-11-10 NOTE — ED TRIAGE NOTES
ED Nursing Triage Note (General)   ________________________________    Jean Paul Andujar is a 35 year old Male that presents to triage private car  With history of  LOC x 3, pupils were dilated, pt wasn't tracking, first time he remembers feeling dizzy when he got up to reset the router, wife states he was out for 3 mins, diaphretic, thready pulse, this then happened again where he fell backwards, EMS was called and VSS normal, pt c/o rib pain and face pain reported by patient   Significant symptoms had onset approx  2100, pt states he had vaso vagal once in elementary and once in highschool    Patient appears alert , in no acute distress., and cooperative behavior.    GCS Total = 15  Airway: intact  Breathing noted as Normal  Circulation Normal  Skin:  Normal  Action taken:  Triage to critical care immediately      PRE HOSPITAL PRIOR LIVING SITUATION Spouse

## 2021-11-10 NOTE — DISCHARGE INSTRUCTIONS
Follow-up in clinic to discuss cardiac monitor patches and your syncopal episodes.  Return here if worse

## 2021-11-11 LAB
ATRIAL RATE - MUSE: 71 BPM
DIASTOLIC BLOOD PRESSURE - MUSE: NORMAL MMHG
INTERPRETATION ECG - MUSE: NORMAL
P AXIS - MUSE: 42 DEGREES
PR INTERVAL - MUSE: 148 MS
QRS DURATION - MUSE: 98 MS
QT - MUSE: 416 MS
QTC - MUSE: 452 MS
R AXIS - MUSE: 52 DEGREES
SYSTOLIC BLOOD PRESSURE - MUSE: NORMAL MMHG
T AXIS - MUSE: 58 DEGREES
VENTRICULAR RATE- MUSE: 71 BPM

## 2021-11-23 ENCOUNTER — VIRTUAL VISIT (OUTPATIENT)
Dept: FAMILY MEDICINE | Facility: OTHER | Age: 36
End: 2021-11-23
Attending: FAMILY MEDICINE
Payer: COMMERCIAL

## 2021-11-23 DIAGNOSIS — R55 SYNCOPE, UNSPECIFIED SYNCOPE TYPE: Primary | ICD-10-CM

## 2021-11-23 PROCEDURE — 99213 OFFICE O/P EST LOW 20 MIN: CPT | Mod: TEL | Performed by: FAMILY MEDICINE

## 2021-11-23 ASSESSMENT — PAIN SCALES - GENERAL: PAINLEVEL: NO PAIN (1)

## 2021-11-23 NOTE — NURSING NOTE
"Chief Complaint   Patient presents with     RECHECK     ER follow-up       Initial There were no vitals taken for this visit. Estimated body mass index is 30.99 kg/m  as calculated from the following:    Height as of 11/9/21: 1.778 m (5' 10\").    Weight as of 11/9/21: 98 kg (216 lb).  Medication Reconciliation: complete    Chelsi Harris LPN  "

## 2021-11-23 NOTE — PROGRESS NOTES
Jean Paul is a 35 year old who is being evaluated via a billable telephone visit.      What phone number would you like to be contacted at? [phone]  How would you like to obtain your AVS? Mail a copy    Assessment & Plan     1. Syncope, unspecified syncope type  Symptoms resolved.  Sounds vasovagal; but because of prior episodes x 2 - plan for Echocardiogram.  Overall feeling better.  Discussed regular hydration, intake.  Monitoring for recurrence.  - Echocardiogram Complete; Future    Donna Downey DO  [facility]    Subjective   Jean Paul is a 35 year old who presents for the following health issues:    HPI     ED/UC Followup:    Facility:  [facility]  Date of visit: 11/09/2021  Reason for visit: Syncope  Current Status:    Has had rib pain, foggy thinking - since that time.   Has improved significantly the last 48 hours.  Uncertain of any rib contusion vs Fx.  Able to take a deep breath.     Reviewed events of ER visit; and leading up to visit.    Had syncope x2 other times in his life.    No chest pain, diaphoresis, or other symptoms that he has noticed since.       Review of Systems   CONSTITUTIONAL: NEGATIVE for fever, chills, change in weight  ENT/MOUTH: NEGATIVE for ear, mouth and throat problems  RESP: NEGATIVE for significant cough or SOB  CV: NEGATIVE for chest pain, palpitations or peripheral edema      Objective    Vitals - Patient Reported  Pain Score: No Pain (1)  Pain Loc: Other - see comment (rib)    Physical Exam   healthy, alert and no distress  PSYCH: Alert and oriented times 3; coherent speech, normal   rate and volume, able to articulate logical thoughts, able   to abstract reason, no tangential thoughts, no hallucinations   or delusions  His affect is normal  RESP: No cough, no audible wheezing, able to talk in full sentences  Remainder of exam unable to be completed due to telephone visits    No results found for any visits on 11/23/21.        Phone call duration: 14  minutes  Start: 12:31 PM  Stop: 12:45 PM

## 2021-11-23 NOTE — PROGRESS NOTES
{PROVIDER CHARTING PREFERENCE:410512}    Kera Reaves is a 35 year old who presents for the following health issues {ACCOMPANIED BY STATEMENT (Optional):383406}    [facility]     ED/UC Followup:    Facility:  [facility]  Date of visit: 11/09/2021  Reason for visit: Syncope  Current Status: ***    Reviewed Dr. Miller's note indicating rather sudden episode of feeling flushed/dizzy when he got up to reset their router.  Sat down to try to let it pass, repeated the motion and started feeling the same.  Doesn't remember a short period of events, until he awoke on the kitchen floor with his wife by him.  [facility] was initially called, looked him over; he was feeling well enough for them to transport to the ER by private vehicle.     Had felt normal earlier in the day; no URI symptoms.  Negative orthostatics, EKG, labs in the ER.    History of vasovagal syncope x2 previous occurrences (once in elementary school; once in high school).       Review of Systems   CONSTITUTIONAL: NEGATIVE for fever, chills, change in weight  ENT/MOUTH: NEGATIVE for ear, mouth and throat problems  RESP: NEGATIVE for significant cough or SOB  CV: NEGATIVE for chest pain, palpitations or peripheral edema  MUSCULOSKELETAL: NEGATIVE for significant arthralgias or myalgia  NEURO: NEGATIVE for weakness, dizziness or paresthesias  ENDOCRINE: NEGATIVE for temperature intolerance, skin/hair changes  HEME/ALLERGY/IMMUNE: NEGATIVE for bleeding problems  PSYCHIATRIC: NEGATIVE for changes in mood or affect      Objective    There were no vitals taken for this visit.  There is no height or weight on file to calculate BMI.  Physical Exam   {MALE - complete :535612}    No results found for any visits on 11/23/21.

## 2022-02-04 ENCOUNTER — HOSPITAL ENCOUNTER (OUTPATIENT)
Dept: CARDIOLOGY | Facility: OTHER | Age: 37
Discharge: HOME | End: 2022-02-04
Attending: FAMILY MEDICINE | Admitting: FAMILY MEDICINE
Payer: COMMERCIAL

## 2022-02-04 DIAGNOSIS — R55 SYNCOPE, UNSPECIFIED SYNCOPE TYPE: ICD-10-CM

## 2022-02-04 LAB — LVEF ECHO: NORMAL

## 2022-02-04 PROCEDURE — 93306 TTE W/DOPPLER COMPLETE: CPT | Mod: 26 | Performed by: INTERNAL MEDICINE

## 2022-02-04 PROCEDURE — 93306 TTE W/DOPPLER COMPLETE: CPT

## 2023-07-26 ENCOUNTER — TELEPHONE (OUTPATIENT)
Dept: OBGYN | Facility: OTHER | Age: 38
End: 2023-07-26
Payer: COMMERCIAL

## 2023-07-26 DIAGNOSIS — Z31.89 ENCOUNTER FOR ARTIFICIAL INSEMINATION: Primary | ICD-10-CM

## 2023-07-26 NOTE — TELEPHONE ENCOUNTER
Yuval called and said that it is okay to put in lab orders for a semen collection for their IUI.  Esperanza Modi on 7/26/2023 at 12:53 PM

## 2023-08-07 ENCOUNTER — LAB (OUTPATIENT)
Dept: LAB | Facility: OTHER | Age: 38
End: 2023-08-07
Attending: STUDENT IN AN ORGANIZED HEALTH CARE EDUCATION/TRAINING PROGRAM
Payer: COMMERCIAL

## 2023-08-07 DIAGNOSIS — Z31.89 ENCOUNTER FOR ARTIFICIAL INSEMINATION: ICD-10-CM

## 2023-08-07 LAB
POST WASH COUNT: 17 MILLION/ML
POST WASH MOTILITY: 58 %
PRE WASH COUNT: 20 MILLION/ML
PRE WASH MOTILITY: 58 %

## 2023-08-07 PROCEDURE — 89310 SEMEN ANALYSIS W/COUNT: CPT | Mod: ZL

## 2024-07-08 ENCOUNTER — TELEPHONE (OUTPATIENT)
Dept: OBGYN | Facility: OTHER | Age: 39
End: 2024-07-08
Payer: COMMERCIAL

## 2024-07-08 DIAGNOSIS — Z13.228 SCREENING FOR CYSTIC FIBROSIS: Primary | ICD-10-CM

## 2024-07-08 NOTE — TELEPHONE ENCOUNTER
Patient wife called and since she is a carrier of cystic fibrosis, they decided to have the patient tested as well.    Please call patient and wife back.   Thank you   Mery Morris on 7/8/2024 at 2:37 PM

## 2024-07-08 NOTE — TELEPHONE ENCOUNTER
Left message on machine to call back, waiting for consent     Connie Ortega RN on 7/8/2024 at 2:55 PM

## 2024-07-08 NOTE — TELEPHONE ENCOUNTER
Patent returned call, states he needs to get scheduled for Labs? Please call patient  Please place orders if appropriate.      Pilar Sandoval on 7/8/2024 at 4:29 PM

## 2024-07-09 ENCOUNTER — LAB (OUTPATIENT)
Dept: LAB | Facility: OTHER | Age: 39
End: 2024-07-09
Payer: COMMERCIAL

## 2024-07-09 ENCOUNTER — DOCUMENTATION ONLY (OUTPATIENT)
Dept: OBGYN | Facility: OTHER | Age: 39
End: 2024-07-09
Payer: COMMERCIAL

## 2024-07-09 DIAGNOSIS — Z13.228 SCREENING FOR CYSTIC FIBROSIS: ICD-10-CM

## 2024-07-09 PROCEDURE — 36415 COLL VENOUS BLD VENIPUNCTURE: CPT | Mod: ZL

## 2024-07-09 NOTE — TELEPHONE ENCOUNTER
Called and spoke with patient. Patient gave verbal consent for lab appointment. Patient transferred to scheduling. Paperwork sent down to lab.     Jonathan Ward RN on 7/9/2024 at 9:23 AM

## 2024-07-09 NOTE — PROGRESS NOTES
Called placed to lab regarding the paperwork. Lab informed that paperwork would need to just be signed by JAC Aguilar CNP who will be back in office tomorrow and we can send it down. Lab stated that it was fine that the paperwork be sent down tomorrow.     Shaila Alejo RN on 7/9/2024 at 4:22 PM

## 2024-07-09 NOTE — PROGRESS NOTES
Does Jean Paul need the [employer] paperwork filled out still with the copy of his insurance? Lab does not have it to send with the sample. If so, please send it to lab at your earliest convenience or call with any questions.Thanks!

## 2024-07-13 ENCOUNTER — HEALTH MAINTENANCE LETTER (OUTPATIENT)
Age: 39
End: 2024-07-13

## 2024-07-22 LAB — SCANNED LAB RESULT: NORMAL

## 2024-07-31 ENCOUNTER — OFFICE VISIT (OUTPATIENT)
Dept: FAMILY MEDICINE | Facility: OTHER | Age: 39
End: 2024-07-31
Attending: STUDENT IN AN ORGANIZED HEALTH CARE EDUCATION/TRAINING PROGRAM
Payer: COMMERCIAL

## 2024-07-31 VITALS
TEMPERATURE: 99.4 F | WEIGHT: 222.5 LBS | HEIGHT: 71 IN | RESPIRATION RATE: 19 BRPM | SYSTOLIC BLOOD PRESSURE: 132 MMHG | OXYGEN SATURATION: 97 % | DIASTOLIC BLOOD PRESSURE: 80 MMHG | BODY MASS INDEX: 31.15 KG/M2 | HEART RATE: 92 BPM

## 2024-07-31 DIAGNOSIS — R52 BODY ACHES: Primary | ICD-10-CM

## 2024-07-31 DIAGNOSIS — R50.9 FEVER IN ADULT: ICD-10-CM

## 2024-07-31 DIAGNOSIS — A69.20 LYME DISEASE: ICD-10-CM

## 2024-07-31 LAB
ALBUMIN SERPL BCG-MCNC: 4.3 G/DL (ref 3.5–5.2)
ALP SERPL-CCNC: 64 U/L (ref 40–150)
ALT SERPL W P-5'-P-CCNC: 64 U/L (ref 0–70)
ANION GAP SERPL CALCULATED.3IONS-SCNC: 9 MMOL/L (ref 7–15)
AST SERPL W P-5'-P-CCNC: 46 U/L (ref 0–45)
BASOPHILS # BLD AUTO: 0.1 10E3/UL (ref 0–0.2)
BASOPHILS NFR BLD AUTO: 2 %
BILIRUB SERPL-MCNC: 0.4 MG/DL
BUN SERPL-MCNC: 10.1 MG/DL (ref 6–20)
CALCIUM SERPL-MCNC: 9.3 MG/DL (ref 8.8–10.4)
CHLORIDE SERPL-SCNC: 102 MMOL/L (ref 98–107)
CREAT SERPL-MCNC: 1.06 MG/DL (ref 0.67–1.17)
CRP SERPL-MCNC: 52.72 MG/L
EGFRCR SERPLBLD CKD-EPI 2021: >90 ML/MIN/1.73M2
EOSINOPHIL # BLD AUTO: 0 10E3/UL (ref 0–0.7)
EOSINOPHIL NFR BLD AUTO: 0 %
ERYTHROCYTE [DISTWIDTH] IN BLOOD BY AUTOMATED COUNT: 12.8 % (ref 10–15)
FLUAV RNA SPEC QL NAA+PROBE: NEGATIVE
FLUBV RNA RESP QL NAA+PROBE: NEGATIVE
GLUCOSE SERPL-MCNC: 92 MG/DL (ref 70–99)
GROUP A STREP BY PCR: NOT DETECTED
HCO3 SERPL-SCNC: 28 MMOL/L (ref 22–29)
HCT VFR BLD AUTO: 43 % (ref 40–53)
HGB BLD-MCNC: 14.6 G/DL (ref 13.3–17.7)
IMM GRANULOCYTES # BLD: 0 10E3/UL
IMM GRANULOCYTES NFR BLD: 0 %
LYMPHOCYTES # BLD AUTO: 0.6 10E3/UL (ref 0.8–5.3)
LYMPHOCYTES NFR BLD AUTO: 20 %
MCH RBC QN AUTO: 29.3 PG (ref 26.5–33)
MCHC RBC AUTO-ENTMCNC: 34 G/DL (ref 31.5–36.5)
MCV RBC AUTO: 86 FL (ref 78–100)
MONOCYTES # BLD AUTO: 0.3 10E3/UL (ref 0–1.3)
MONOCYTES NFR BLD AUTO: 11 %
NEUTROPHILS # BLD AUTO: 2.1 10E3/UL (ref 1.6–8.3)
NEUTROPHILS NFR BLD AUTO: 67 %
NRBC # BLD AUTO: 0 10E3/UL
NRBC BLD AUTO-RTO: 0 /100
PLATELET # BLD AUTO: 150 10E3/UL (ref 150–450)
POTASSIUM SERPL-SCNC: 4.3 MMOL/L (ref 3.4–5.3)
PROT SERPL-MCNC: 7.6 G/DL (ref 6.4–8.3)
RBC # BLD AUTO: 4.98 10E6/UL (ref 4.4–5.9)
RSV RNA SPEC NAA+PROBE: NEGATIVE
SARS-COV-2 RNA RESP QL NAA+PROBE: NEGATIVE
SODIUM SERPL-SCNC: 139 MMOL/L (ref 135–145)
WBC # BLD AUTO: 3.2 10E3/UL (ref 4–11)

## 2024-07-31 PROCEDURE — 99214 OFFICE O/P EST MOD 30 MIN: CPT | Performed by: NURSE PRACTITIONER

## 2024-07-31 PROCEDURE — 87651 STREP A DNA AMP PROBE: CPT | Mod: ZL | Performed by: NURSE PRACTITIONER

## 2024-07-31 PROCEDURE — 85025 COMPLETE CBC W/AUTO DIFF WBC: CPT | Mod: ZL | Performed by: NURSE PRACTITIONER

## 2024-07-31 PROCEDURE — 86618 LYME DISEASE ANTIBODY: CPT | Mod: ZL | Performed by: NURSE PRACTITIONER

## 2024-07-31 PROCEDURE — 87637 SARSCOV2&INF A&B&RSV AMP PRB: CPT | Mod: ZL | Performed by: NURSE PRACTITIONER

## 2024-07-31 PROCEDURE — 86140 C-REACTIVE PROTEIN: CPT | Mod: ZL | Performed by: NURSE PRACTITIONER

## 2024-07-31 PROCEDURE — 87798 DETECT AGENT NOS DNA AMP: CPT | Mod: ZL | Performed by: NURSE PRACTITIONER

## 2024-07-31 PROCEDURE — 36415 COLL VENOUS BLD VENIPUNCTURE: CPT | Mod: ZL | Performed by: NURSE PRACTITIONER

## 2024-07-31 PROCEDURE — 80053 COMPREHEN METABOLIC PANEL: CPT | Mod: ZL | Performed by: NURSE PRACTITIONER

## 2024-07-31 RX ORDER — DOXYCYCLINE 100 MG/1
100 CAPSULE ORAL 2 TIMES DAILY
Qty: 14 CAPSULE | Refills: 0 | Status: SHIPPED | OUTPATIENT
Start: 2024-07-31 | End: 2024-08-07

## 2024-07-31 ASSESSMENT — ENCOUNTER SYMPTOMS
CHILLS: 1
ARTHRALGIAS: 1
ACTIVITY CHANGE: 1
CARDIOVASCULAR NEGATIVE: 1
EYES NEGATIVE: 1
FEVER: 1
FATIGUE: 1
PSYCHIATRIC NEGATIVE: 1
RESPIRATORY NEGATIVE: 1
NEUROLOGICAL NEGATIVE: 1
GASTROINTESTINAL NEGATIVE: 1

## 2024-07-31 ASSESSMENT — PAIN SCALES - GENERAL: PAINLEVEL: NO PAIN (0)

## 2024-07-31 NOTE — NURSING NOTE
"Chief Complaint   Patient presents with    Chills    Generalized Body Aches    Fever     Patient here for fever, chills and body aches x4 days. He notes a small deer tick bite x5 days ago. Has been treating with tylenol and ibuprofen. He is styaing hydrated.     Initial /80   Pulse 92   Temp 99.4  F (37.4  C) (Tympanic)   Resp 19   Ht 1.791 m (5' 10.5\")   Wt 100.9 kg (222 lb 8 oz)   SpO2 97%   BMI 31.47 kg/m   Estimated body mass index is 31.47 kg/m  as calculated from the following:    Height as of this encounter: 1.791 m (5' 10.5\").    Weight as of this encounter: 100.9 kg (222 lb 8 oz).  Medication Review: complete    The next two questions are to help us understand your food security.  If you are feeling you need any assistance in this area, we have resources available to support you today.          7/31/2024   SDOH- Food Insecurity   Within the past 12 months, did you worry that your food would run out before you got money to buy more? N   Within the past 12 months, did the food you bought just not last and you didn t have money to get more? N            Health Care Directive:  Patient does not have a Health Care Directive or Living Will: Discussed advance care planning with patient; however, patient declined at this time.    Kelle Gauthier, LPN      "

## 2024-07-31 NOTE — PROGRESS NOTES
Jean Paul Andujar  1985    ASSESSMENT/PLAN    Presents to [facility] clinic with 4 days of fever, chills, body aches.  Patient has had frequent deer tick bites and attachments.  Last one 5 days ago.  Patient states he has had several this summer approximately every 2 weeks.  Labs obtained and show no normal CMP, mild elevation in AST up to 46.  CRP elevated at 52.  White blood cells low at 3.2.  Hemoglobin 14, platelets 150.  Tick and Lyme panel pending.  Strep, COVID, influenza, RSV negative.  Patient's vitals are stable and he appears nontoxic though ill.  Given patient's symptoms and low white blood cell, plan to treat with doxycycline and await testing results.      1. Body aches  2. Fever in adult  3. Lyme disease      - CBC and Differential  - Comprehensive Metabolic Panel  - CRP inflammation  - LYME DISEASE TOTAL ANTIBODIES WITH REFLEX TO CONFIRMATION  - Tick-Borne Disease Panel by PCR  - Group A Streptococcus PCR Throat Swab  - Symptomatic Influenza A/B, RSV, & SARS-CoV2 PCR (COVID-19) Nose    - doxycycline hyclate (VIBRAMYCIN) 100 MG capsule; Take 1 capsule (100 mg) by mouth 2 times daily for 14 doses  Dispense: 14 capsule; Refill: 0    - May use over-the-counter Tylenol or ibuprofen PRN  - Follow up as needed for new or worsening symptoms      *Explanation of diagnosis, treatment options and risk and benefits of medications reviewed with patient. Patient agrees with plan of care.  *All questions were answered.    *Red flags symptoms were discussed and patient was advised when they should return for reevaluation or for prompt emergency evaluation.   *Patient was given verbal and written instructions on plan of care. Instructions were printed or are available on [employer] on electronic AVS.   *We discussed potential side effects of any prescribed or recommended therapies, as well as expectations for response to treatments.  *Patient discharged in stable condition    Stacey Rubio, CNP  [facility]    SUBJECTIVE  CHIEF COMPLAINT/ REASON FOR VISIT  Patient presents with:  Chills  Generalized Body Aches  Fever     HISTORY OF PRESENT ILLNESS  Jean Paul Andujar is a pleasant 38 year old male presents to [facility] clinic today with fever, chills, body aches.  Patient does note frequent deer tick bites with last one 5 days ago.  Patient states he has had deer tick attachment every 2 weeks or so this summer.  No cough, congestion.    I have reviewed the nursing notes.  I have reviewed allergies, medication list, problem list, and past medical history.    REVIEW OF SYSTEMS  Review of Systems   Constitutional:  Positive for activity change, chills, fatigue and fever.   HENT: Negative.     Eyes: Negative.    Respiratory: Negative.     Cardiovascular: Negative.    Gastrointestinal: Negative.    Genitourinary: Negative.    Musculoskeletal:  Positive for arthralgias.   Neurological: Negative.    Psychiatric/Behavioral: Negative.     All other systems reviewed and are negative.       VITAL SIGNS  Vitals:    07/31/24 1710   BP: 132/80   Pulse: 92   Resp: 19   Temp: 99.4  F (37.4  C)   TempSrc: Tympanic   SpO2: 97%   Weight: 100.9 kg (222 lb 8 oz)   Height: 1.791 m (5' 10.5\")      Body mass index is 31.47 kg/m .      OBJECTIVE  PHYSICAL EXAM  Physical Exam  Vitals and nursing note reviewed.   Constitutional:       Appearance: Normal appearance.   HENT:      Head: Normocephalic.      Right Ear: Tympanic membrane normal.      Left Ear: Tympanic membrane normal.      Nose: Nose normal.      Mouth/Throat:      Mouth: Mucous membranes are moist.   Eyes:      Pupils: Pupils are equal, round, and reactive to light.   Cardiovascular:      Rate and Rhythm: Normal rate and regular rhythm.      Pulses: Normal pulses.      Heart sounds: Normal heart sounds.   Pulmonary:      Effort: Pulmonary effort is normal.      Breath sounds: Normal breath sounds.   Abdominal:      Palpations: Abdomen is soft.   Musculoskeletal:         General: " Normal range of motion.      Cervical back: Normal range of motion.   Lymphadenopathy:      Cervical: No cervical adenopathy.   Skin:     General: Skin is warm and dry.      Capillary Refill: Capillary refill takes less than 2 seconds.   Neurological:      General: No focal deficit present.      Mental Status: He is alert.          DIAGNOSTICS  Results for orders placed or performed in visit on 07/31/24   Comprehensive Metabolic Panel     Status: Abnormal   Result Value Ref Range    Sodium 139 135 - 145 mmol/L    Potassium 4.3 3.4 - 5.3 mmol/L    Carbon Dioxide (CO2) 28 22 - 29 mmol/L    Anion Gap 9 7 - 15 mmol/L    Urea Nitrogen 10.1 6.0 - 20.0 mg/dL    Creatinine 1.06 0.67 - 1.17 mg/dL    GFR Estimate >90 >60 mL/min/1.73m2    Calcium 9.3 8.8 - 10.4 mg/dL    Chloride 102 98 - 107 mmol/L    Glucose 92 70 - 99 mg/dL    Alkaline Phosphatase 64 40 - 150 U/L    AST 46 (H) 0 - 45 U/L    ALT 64 0 - 70 U/L    Protein Total 7.6 6.4 - 8.3 g/dL    Albumin 4.3 3.5 - 5.2 g/dL    Bilirubin Total 0.4 <=1.2 mg/dL   CRP inflammation     Status: Abnormal   Result Value Ref Range    CRP Inflammation 52.72 (H) <5.00 mg/L   Symptomatic Influenza A/B, RSV, & SARS-CoV2 PCR (COVID-19) Nose     Status: Normal    Specimen: Nose; Swab   Result Value Ref Range    Influenza A PCR Negative Negative    Influenza B PCR Negative Negative    RSV PCR Negative Negative    SARS CoV2 PCR Negative Negative    Narrative    Testing was performed using the Xpert Xpress CoV2/Flu/RSV Assay on the [employer] Gene[employer]pert Instrument. This test should be ordered for the detection of SARS-CoV-2, influenza, and RSV viruses in individuals who meet clinical and/or epidemiological criteria. Test performance is unknown in asymptomatic patients. This test is for in vitro diagnostic use under the FDA EUA for laboratories certified under CLIA to perform high or moderate complexity testing. This test has not been FDA cleared or approved. A negative result does not rule out the  presence of PCR inhibitors in the specimen or target RNA in concentration below the limit of detection for the assay. If only one viral target is positive but coinfection with multiple targets is suspected, the sample should be re-tested with another FDA cleared, approved, or authorized test, if coinfection would change clinical management. This test was validated by the [facility]. These laboratories are certified under the Clinical Laboratory Improvement Amendments of 1988 (CLIA-88) as qualified to perform high complexity laboratory testing.   CBC with platelets and differential     Status: Abnormal   Result Value Ref Range    WBC Count 3.2 (L) 4.0 - 11.0 10e3/uL    RBC Count 4.98 4.40 - 5.90 10e6/uL    Hemoglobin 14.6 13.3 - 17.7 g/dL    Hematocrit 43.0 40.0 - 53.0 %    MCV 86 78 - 100 fL    MCH 29.3 26.5 - 33.0 pg    MCHC 34.0 31.5 - 36.5 g/dL    RDW 12.8 10.0 - 15.0 %    Platelet Count 150 150 - 450 10e3/uL    % Neutrophils 67 %    % Lymphocytes 20 %    % Monocytes 11 %    % Eosinophils 0 %    % Basophils 2 %    % Immature Granulocytes 0 %    NRBCs per 100 WBC 0 <1 /100    Absolute Neutrophils 2.1 1.6 - 8.3 10e3/uL    Absolute Lymphocytes 0.6 (L) 0.8 - 5.3 10e3/uL    Absolute Monocytes 0.3 0.0 - 1.3 10e3/uL    Absolute Eosinophils 0.0 0.0 - 0.7 10e3/uL    Absolute Basophils 0.1 0.0 - 0.2 10e3/uL    Absolute Immature Granulocytes 0.0 <=0.4 10e3/uL    Absolute NRBCs 0.0 10e3/uL   Group A Streptococcus PCR Throat Swab     Status: Normal    Specimen: Throat; Swab   Result Value Ref Range    Group A strep by PCR Not Detected Not Detected    Narrative    The Xpert Xpress Strep A test, performed on the [employer], is a rapid, qualitative in vitro diagnostic test for the detection of Streptococcus pyogenes (Group A ß-hemolytic Streptococcus, Strep A) in throat swab specimens from patients with signs and symptoms of pharyngitis. The Xpert Xpress Strep A test can be used as an aid  in the diagnosis of Group A Streptococcal pharyngitis. The assay is not intended to monitor treatment for Group A Streptococcus infections. The Xpert Xpress Strep A test utilizes an automated real-time polymerase chain reaction (PCR) to detect Streptococcus pyogenes DNA.   CBC and Differential     Status: Abnormal    Narrative    The following orders were created for panel order CBC and Differential.  Procedure                               Abnormality         Status                     ---------                               -----------         ------                     CBC with platelets and d...[[id number]]  Abnormal            Final result                 Please view results for these tests on the individual orders.

## 2024-08-01 LAB — B BURGDOR IGG+IGM SER QL: 0.09

## 2024-08-02 LAB
A PHAGOCYTOPH DNA BLD QL NAA+PROBE: DETECTED
BABESIA DNA BLD QL NAA+PROBE: NOT DETECTED
EHRLICHIA DNA SPEC QL NAA+PROBE: NOT DETECTED

## 2025-01-07 ENCOUNTER — IMMUNIZATION (OUTPATIENT)
Dept: FAMILY MEDICINE | Facility: OTHER | Age: 40
End: 2025-01-07
Payer: COMMERCIAL

## 2025-01-07 DIAGNOSIS — Z23 ENCOUNTER FOR IMMUNIZATION: ICD-10-CM

## 2025-01-07 DIAGNOSIS — Z23 NEED FOR VACCINATION: Primary | ICD-10-CM

## 2025-01-07 NOTE — PROGRESS NOTES
Prior to immunization administration, verified patients identity using patient s name and date of birth. Please see Immunization Activity for additional information.     Is the patient's temperature normal (100.5 or less)? Yes  Temp 98.2    Patient MEETS CRITERIA. PROCEED with vaccine administration.      Patient instructed to remain in clinic for 15Immunization Documentation  Verified patient's first and last name, and . Stated reason for visit today is to receive 2 vaccines. Accompained to clinic visit with Self. Denied any concerns with previous immunizations. Allergies reviewed. VIS handout(s) reviewed and given to take home. COVID and FLU vaccines  prepared and administered per standing order. Administration documented in IMMUNIZATIONS (see flowsheet and order for further information). Patient Instructed to wait in lobby for 15 minutes post-injection and notify staff immediately of any reaction.     Neelam Green RN ....................  2025   2:32 PM   minutes afterwards, and to report any adverse reactions.      Link to Ancillary Visit Immunization Standing Orders SmartSet     Screening performed by Neelam Green RN on 2025 at 2:31 PM.

## 2025-07-19 ENCOUNTER — HEALTH MAINTENANCE LETTER (OUTPATIENT)
Age: 40
End: 2025-07-19

## (undated) RX ORDER — TETRACAINE HYDROCHLORIDE 5 MG/ML
SOLUTION OPHTHALMIC
Status: DISPENSED
Start: 2018-05-07

## (undated) RX ORDER — SODIUM CHLORIDE FOR INHALATION 0.9 %
VIAL, NEBULIZER (ML) INHALATION
Status: DISPENSED
Start: 2018-05-07